# Patient Record
Sex: MALE
[De-identification: names, ages, dates, MRNs, and addresses within clinical notes are randomized per-mention and may not be internally consistent; named-entity substitution may affect disease eponyms.]

---

## 2020-07-26 PROBLEM — Z00.00 ENCOUNTER FOR PREVENTIVE HEALTH EXAMINATION: Status: ACTIVE | Noted: 2020-07-26

## 2020-08-26 ENCOUNTER — APPOINTMENT (OUTPATIENT)
Dept: VASCULAR SURGERY | Facility: CLINIC | Age: 60
End: 2020-08-26
Payer: COMMERCIAL

## 2020-08-26 VITALS — DIASTOLIC BLOOD PRESSURE: 80 MMHG | SYSTOLIC BLOOD PRESSURE: 209 MMHG | HEART RATE: 78 BPM

## 2020-08-26 DIAGNOSIS — I63.9 CEREBRAL INFARCTION, UNSPECIFIED: ICD-10-CM

## 2020-08-26 DIAGNOSIS — Z87.39 PERSONAL HISTORY OF OTHER DISEASES OF THE MUSCULOSKELETAL SYSTEM AND CONNECTIVE TISSUE: ICD-10-CM

## 2020-08-26 DIAGNOSIS — Z86.79 PERSONAL HISTORY OF OTHER DISEASES OF THE CIRCULATORY SYSTEM: ICD-10-CM

## 2020-08-26 DIAGNOSIS — I65.22 OCCLUSION AND STENOSIS OF LEFT CAROTID ARTERY: ICD-10-CM

## 2020-08-26 DIAGNOSIS — Z92.3 PERSONAL HISTORY OF IRRADIATION: ICD-10-CM

## 2020-08-26 DIAGNOSIS — Z85.46 PERSONAL HISTORY OF MALIGNANT NEOPLASM OF PROSTATE: ICD-10-CM

## 2020-08-26 DIAGNOSIS — E11.9 TYPE 2 DIABETES MELLITUS W/OUT COMPLICATIONS: ICD-10-CM

## 2020-08-26 PROCEDURE — 99204 OFFICE O/P NEW MOD 45 MIN: CPT

## 2020-08-26 PROCEDURE — 93880 EXTRACRANIAL BILAT STUDY: CPT

## 2020-08-26 RX ORDER — CLOPIDOGREL 75 MG/1
75 TABLET, FILM COATED ORAL
Refills: 0 | Status: ACTIVE | COMMUNITY

## 2020-08-26 RX ORDER — NIFEDIPINE 20 MG/1
CAPSULE ORAL
Refills: 0 | Status: ACTIVE | COMMUNITY

## 2020-08-26 RX ORDER — FINASTERIDE 5 MG/1
5 TABLET, FILM COATED ORAL
Refills: 0 | Status: ACTIVE | COMMUNITY

## 2020-08-26 RX ORDER — LISINOPRIL 20 MG/1
20 TABLET ORAL
Refills: 0 | Status: ACTIVE | COMMUNITY

## 2020-08-26 RX ORDER — ASPIRIN 81 MG
81 TABLET, DELAYED RELEASE (ENTERIC COATED) ORAL
Refills: 0 | Status: ACTIVE | COMMUNITY

## 2020-08-26 RX ORDER — GLIPIZIDE 2.5 MG/1
TABLET ORAL
Refills: 0 | Status: ACTIVE | COMMUNITY

## 2020-08-26 RX ORDER — ATORVASTATIN CALCIUM 80 MG/1
80 TABLET, FILM COATED ORAL
Refills: 0 | Status: ACTIVE | COMMUNITY

## 2020-08-26 RX ORDER — TAMSULOSIN HYDROCHLORIDE 0.4 MG/1
0.4 CAPSULE ORAL
Refills: 0 | Status: ACTIVE | COMMUNITY

## 2020-09-08 NOTE — ASSESSMENT
[FreeTextEntry1] : 58 y/o M w/ recent stroke (left sided symptoms; resolved), and presents for initial evaluation of carotid stenosis. On exam, no focal neurological deficit. No carotid bruits. Carotid US done at the office demonstrates the RCA to have no significant stenosis. On the other hand, the LCA shows 70-99% stenosis with complex plaque and irregular borders noted. Given the left side of the body symptomatology and US carotid findings will review the outside hospital scans the patient underwent to r/o other intra arterial pathologies and proceed accordingly . Discussed the need L CEA due to the severity of left carotid stenosis. The risks, benefits, convalescence and alternatives were reviewed. Numerous questions were asked and answered all in the presence of the patients daughter. Patient recommended to continue ASA and Plavix as well as statin.

## 2020-09-08 NOTE — HISTORY OF PRESENT ILLNESS
[FreeTextEntry1] : 60 y/o M with PMHx of HTN, HLD, T2DM, arthritis, chronic back pain, prostate CA, and PAD who presents for initial evaluation of carotid stenosis and second opinion on surgery. Patient recently had a stroke in March which affected his left side from which he has recovered.  He reports the stroke presented with symptoms of left-sided weakness of arm and leg. He reports no visual or speech impairments. He did have droopiness. Today he is accompanied by daughter who reports there are multiple studies that were completed at the OSH but they forgot the CD/reports at home. Patient feels well overall but has not been very active lately due to COVID and reports forgetfulness secondary to the stroke. He uses a cane now to ambulate. He is on daily aspirin and Plavix since the stroke ; also takes daily statin. \par \par Denies: SOB, LOC, syncope, CP, palpitations, fever, chills. 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 \par \par He is accompanied today by his daughter.

## 2020-09-08 NOTE — PHYSICAL EXAM
[Respiratory Effort] : normal respiratory effort [Normal Rate and Rhythm] : normal rate and rhythm [2+] : right 2+ [1+] : left 1+ [No Rash or Lesion] : No rash or lesion [Oriented to Person] : oriented to person [Oriented to Place] : oriented to place [Alert] : alert [Calm] : calm [Oriented to Time] : oriented to time [JVD] : no jugular venous distention  [Carotid Bruits] : no carotid bruits [Right Carotid Bruit] : no bruit heard over the right carotid [Left Carotid Bruit] : no bruit heard over the left carotid [Ankle Swelling (On Exam)] : not present [Abdomen Tenderness] : ~T ~M No abdominal tenderness [Varicose Veins Of Lower Extremities] : not present [] : not present [de-identified] : Cooperative, Calm  [de-identified] : NCAT [de-identified] : Clear [de-identified] : FROM + in all extremities

## 2020-09-08 NOTE — PROCEDURE
[FreeTextEntry1] : Carotid duplex done today:\par RCA: no significant stenosis\par LCA: 70-99% stenosis with complicated plaque of irregular borders noted.

## 2020-09-08 NOTE — ADDENDUM
[FreeTextEntry1] : This note was written by Katarzyna Acosta on 08/26/2020 acting as scribe for Dr. Thony Oneill M.D.\par \par I, Dr. Thony Oneill, have read and attest that all the information, medical decision making and discharge instructions within are true and accurate.

## 2020-09-08 NOTE — CONSULT LETTER
[Dear  ___] : Dear  [unfilled], [FreeTextEntry2] : Kermit Faustin MD\par [address]\par [place] [address]\par \par Yajaira Vallejo MD\par [address]\[place] [address]\par \par Ana Barrios MD\par [address]\[place] [address] [FreeTextEntry3] : Sincerely, \par \par Thony Oneill M.D. \par , Surgical [facility]\par , Department of Surgery [facility]\par Professor of Surgery, [employer] at [facility]  [FreeTextEntry1] : I saw Mr John Richey for initial evaluation of carotid stenosis. He reports having a recent stroke in March affecting his left arm, left leg and having facial droopiness. Today, his symptoms have resolved and he is compliant with aspirin, Plavix and statin medications which were started at that time. He is accompanied by the daughter who [facility] found to have stenosis of a carotid artery. They have already been offered surgery and are here for a second opinion.\par \par On exam, no focal neurological deficit. No carotid bruits. Blood pressure is elevated 209/80, repeat 200/79 and heart rate 78 b/min. He states he is very nervous and the pressure has been elevated recently. He states the blood pressure medications are being monitored and changed recently.\par \par Carotid ultrasound completed in the office demonstrated the right carotid has no significant stenosis. The left carotid shows >80% stenosis with complex plaque and irregular borders.\par \par Ms Richey has severe stenosis of the left carotid artery that warrants intervention. I am unable to explain the source of the reported right brain stroke and left body symptoms given the severe stenosis is on the left side. I have explained to him and the daughter further work might be warranted to rule out any other etiology from the right-sided stroke in order to proceed with the surgery. However, the high grade left carotid stenosis would require surgery regardless of the symptomatic status since it is critically narrowed.  The velocities are 434 over 124 cm/sec.  We discussed the different surgical options and I advised him to proceed with a left carotid endarterectomy. His daughter will bring the records soon for us to review and we will make arrangements accordingly. \par \par My complete EMR office note is below for your records.

## 2020-11-11 ENCOUNTER — APPOINTMENT (OUTPATIENT)
Dept: VASCULAR SURGERY | Facility: CLINIC | Age: 60
End: 2020-11-11

## 2020-11-19 ENCOUNTER — NON-APPOINTMENT (OUTPATIENT)
Age: 60
End: 2020-11-19

## 2023-10-01 PROBLEM — Z92.3 HISTORY OF RADIATION THERAPY: Status: RESOLVED | Noted: 2020-08-26 | Resolved: 2023-10-01
